# Patient Record
Sex: FEMALE | Race: WHITE | Employment: OTHER | URBAN - METROPOLITAN AREA
[De-identification: names, ages, dates, MRNs, and addresses within clinical notes are randomized per-mention and may not be internally consistent; named-entity substitution may affect disease eponyms.]

---

## 2017-05-26 PROCEDURE — 88305 TISSUE EXAM BY PATHOLOGIST: CPT | Performed by: INTERNAL MEDICINE

## 2017-09-04 ENCOUNTER — HOSPITAL ENCOUNTER (EMERGENCY)
Facility: HOSPITAL | Age: 67
Discharge: HOME | End: 2017-09-04
Attending: EMERGENCY MEDICINE
Payer: MEDICARE

## 2017-09-04 ENCOUNTER — APPOINTMENT (OUTPATIENT)
Dept: GENERAL RADIOLOGY | Facility: HOSPITAL | Age: 67
End: 2017-09-04
Attending: EMERGENCY MEDICINE
Payer: MEDICARE

## 2017-09-04 VITALS
WEIGHT: 138 LBS | TEMPERATURE: 97 F | OXYGEN SATURATION: 98 % | SYSTOLIC BLOOD PRESSURE: 141 MMHG | DIASTOLIC BLOOD PRESSURE: 58 MMHG | BODY MASS INDEX: 20.92 KG/M2 | HEART RATE: 74 BPM | HEIGHT: 68 IN | RESPIRATION RATE: 16 BRPM

## 2017-09-04 DIAGNOSIS — I48.91 ATRIAL FIBRILLATION, NEW ONSET (HCC): Primary | ICD-10-CM

## 2017-09-04 LAB
ALBUMIN SERPL-MCNC: 4 G/DL (ref 3.5–4.8)
ALP LIVER SERPL-CCNC: 53 U/L (ref 50–130)
ALT SERPL-CCNC: 21 U/L (ref 14–54)
AST SERPL-CCNC: 14 U/L (ref 15–41)
BASOPHILS # BLD AUTO: 0.02 X10(3) UL (ref 0–0.1)
BASOPHILS NFR BLD AUTO: 0.3 %
BILIRUB SERPL-MCNC: 0.4 MG/DL (ref 0.1–2)
BUN BLD-MCNC: 12 MG/DL (ref 8–20)
CALCIUM BLD-MCNC: 9.5 MG/DL (ref 8.3–10.3)
CHLORIDE: 102 MMOL/L (ref 101–111)
CO2: 25 MMOL/L (ref 22–32)
CREAT BLD-MCNC: 0.83 MG/DL (ref 0.55–1.02)
EOSINOPHIL # BLD AUTO: 0.11 X10(3) UL (ref 0–0.3)
EOSINOPHIL NFR BLD AUTO: 1.9 %
ERYTHROCYTE [DISTWIDTH] IN BLOOD BY AUTOMATED COUNT: 11.9 % (ref 11.5–16)
GLUCOSE BLD-MCNC: 120 MG/DL (ref 70–99)
HCT VFR BLD AUTO: 40.6 % (ref 34–50)
HGB BLD-MCNC: 13.8 G/DL (ref 12–16)
IMMATURE GRANULOCYTE COUNT: 0.02 X10(3) UL (ref 0–1)
IMMATURE GRANULOCYTE RATIO %: 0.3 %
LYMPHOCYTES # BLD AUTO: 1.85 X10(3) UL (ref 0.9–4)
LYMPHOCYTES NFR BLD AUTO: 32.1 %
M PROTEIN MFR SERPL ELPH: 7.4 G/DL (ref 6.1–8.3)
MCH RBC QN AUTO: 31.5 PG (ref 27–33.2)
MCHC RBC AUTO-ENTMCNC: 34 G/DL (ref 31–37)
MCV RBC AUTO: 92.7 FL (ref 81–100)
MONOCYTES # BLD AUTO: 0.63 X10(3) UL (ref 0.1–0.6)
MONOCYTES NFR BLD AUTO: 10.9 %
NEUTROPHIL ABS PRELIM: 3.13 X10 (3) UL (ref 1.3–6.7)
NEUTROPHILS # BLD AUTO: 3.13 X10(3) UL (ref 1.3–6.7)
NEUTROPHILS NFR BLD AUTO: 54.5 %
PLATELET # BLD AUTO: 238 10(3)UL (ref 150–450)
POTASSIUM SERPL-SCNC: 3.7 MMOL/L (ref 3.6–5.1)
RBC # BLD AUTO: 4.38 X10(6)UL (ref 3.8–5.1)
RED CELL DISTRIBUTION WIDTH-SD: 40.8 FL (ref 35.1–46.3)
SODIUM SERPL-SCNC: 136 MMOL/L (ref 136–144)
TROPONIN: <0.046 NG/ML (ref ?–0.05)
WBC # BLD AUTO: 5.8 X10(3) UL (ref 4–13)

## 2017-09-04 PROCEDURE — 84484 ASSAY OF TROPONIN QUANT: CPT | Performed by: EMERGENCY MEDICINE

## 2017-09-04 PROCEDURE — 99285 EMERGENCY DEPT VISIT HI MDM: CPT

## 2017-09-04 PROCEDURE — 80053 COMPREHEN METABOLIC PANEL: CPT | Performed by: EMERGENCY MEDICINE

## 2017-09-04 PROCEDURE — 71010 XR CHEST AP PORTABLE  (CPT=71010): CPT | Performed by: EMERGENCY MEDICINE

## 2017-09-04 PROCEDURE — 93005 ELECTROCARDIOGRAM TRACING: CPT

## 2017-09-04 PROCEDURE — 36415 COLL VENOUS BLD VENIPUNCTURE: CPT

## 2017-09-04 PROCEDURE — 85025 COMPLETE CBC W/AUTO DIFF WBC: CPT

## 2017-09-04 PROCEDURE — 85025 COMPLETE CBC W/AUTO DIFF WBC: CPT | Performed by: EMERGENCY MEDICINE

## 2017-09-04 PROCEDURE — 93010 ELECTROCARDIOGRAM REPORT: CPT

## 2017-09-04 PROCEDURE — 84484 ASSAY OF TROPONIN QUANT: CPT

## 2017-09-04 PROCEDURE — 80053 COMPREHEN METABOLIC PANEL: CPT

## 2017-09-04 RX ORDER — ASPIRIN 325 MG
325 TABLET ORAL DAILY
Qty: 30 TABLET | Refills: 0 | Status: SHIPPED | OUTPATIENT
Start: 2017-09-04 | End: 2017-10-04

## 2017-09-04 RX ORDER — ASPIRIN 81 MG/1
324 TABLET, CHEWABLE ORAL ONCE
Status: COMPLETED | OUTPATIENT
Start: 2017-09-04 | End: 2017-09-04

## 2017-09-05 LAB
ATRIAL RATE: 138 BPM
ATRIAL RATE: 77 BPM
P AXIS: 78 DEGREES
P-R INTERVAL: 198 MS
Q-T INTERVAL: 310 MS
Q-T INTERVAL: 378 MS
QRS DURATION: 80 MS
QRS DURATION: 82 MS
QTC CALCULATION (BEZET): 427 MS
QTC CALCULATION (BEZET): 456 MS
R AXIS: 44 DEGREES
R AXIS: 67 DEGREES
T AXIS: -78 DEGREES
T AXIS: 53 DEGREES
VENTRICULAR RATE: 130 BPM
VENTRICULAR RATE: 77 BPM

## 2017-09-05 NOTE — ED NOTES
Pt resting comfortably with family at bedside. Pt converted to NSR without intervention. Denies palpitations at present time.

## 2017-09-05 NOTE — ED INITIAL ASSESSMENT (HPI)
Pt presents to ED with with palpitations since this Am. Pt reports palpitations intermittent throughout the day with persistent palpitations since 815pm. Pt denies chest pain, dizziness. Pt reports mild SOB.

## 2017-09-05 NOTE — ED PROVIDER NOTES
Patient Seen in: [facility] Emergency Department    History   Patient presents with:  Arrythmia/Palpitations (cardiovascular)    Stated Complaint: chest pain, palpitations    HPI    62-year-old woman here with palpitations have been on and off for s Tab,  Take 0.5 tablets (12.5 mg total) by mouth 2 (two) times daily. aspirin 325 MG Oral Tab,  Take 1 tablet (325 mg total) by mouth daily. Estrogens Conjugated (PREMARIN) 0.3 MG Oral Tab,  Take 1 tablet (0.3 mg total) by mouth every other day.    [place] kg/m²         Physical Exam   Constitutional: She is oriented to person, place, and time. She appears well-developed and well-nourished. No distress. HENT:   Head: Normocephalic and atraumatic.    Right Ear: External ear normal.   Left Ear: External ear n CBC W/ DIFFERENTIAL[[id number]]          Abnormal            Final result                 Please view results for these tests on the individual orders.    RAINBOW DRAW BLUE   RAINBOW DRAW GOLD   RAINBOW DRAW LAVENDER   RAINBOW DRAW LIGHT GREEN     EKG    R outpatient follow-up. Patient's well-appearing nontoxic stable for discharge.     Disposition and Plan     Clinical Impression:  Atrial fibrillation, new onset ([address]  (primary encounter diagnosis)    Disposition:  Discharge    Follow-up:  Wilfred Odonnell MD

## 2017-09-06 PROBLEM — I48.91 ATRIAL FIBRILLATION, NEW ONSET (HCC): Status: ACTIVE | Noted: 2017-09-04

## 2017-12-06 PROBLEM — E78.2 MIXED HYPERLIPIDEMIA: Status: ACTIVE | Noted: 2017-12-06

## 2018-03-14 PROCEDURE — 87086 URINE CULTURE/COLONY COUNT: CPT | Performed by: FAMILY MEDICINE

## 2018-05-15 PROCEDURE — 87086 URINE CULTURE/COLONY COUNT: CPT | Performed by: FAMILY MEDICINE

## 2018-06-18 PROBLEM — R07.2 PRECORDIAL CHEST PAIN: Status: ACTIVE | Noted: 2018-06-18

## 2018-08-28 PROBLEM — Z86.79 HISTORY OF ATRIAL FIBRILLATION: Status: ACTIVE | Noted: 2018-08-28

## 2018-08-28 PROCEDURE — 84134 ASSAY OF PREALBUMIN: CPT | Performed by: FAMILY MEDICINE

## 2018-08-28 PROCEDURE — 81001 URINALYSIS AUTO W/SCOPE: CPT | Performed by: FAMILY MEDICINE

## 2020-01-14 PROBLEM — I48.0 PAROXYSMAL ATRIAL FIBRILLATION (HCC): Status: ACTIVE | Noted: 2020-01-14

## 2020-01-14 PROBLEM — I48.91 ATRIAL FIBRILLATION, NEW ONSET (HCC): Status: RESOLVED | Noted: 2017-09-04 | Resolved: 2020-01-14

## 2020-01-22 PROBLEM — R07.2 PRECORDIAL CHEST PAIN: Status: RESOLVED | Noted: 2018-06-18 | Resolved: 2020-01-22

## 2021-11-03 PROBLEM — D68.69 SECONDARY HYPERCOAGULABLE STATE (HCC): Status: ACTIVE | Noted: 2021-11-03

## 2022-01-20 PROBLEM — F41.1 GAD (GENERALIZED ANXIETY DISORDER): Status: ACTIVE | Noted: 2022-01-20

## 2022-01-20 PROBLEM — F41.0 PANIC DISORDER (EPISODIC PAROXYSMAL ANXIETY): Status: ACTIVE | Noted: 2022-01-20

## 2022-01-20 PROBLEM — Z86.79 H/O MITRAL VALVE PROLAPSE: Status: ACTIVE | Noted: 2022-01-20

## (undated) NOTE — ED AVS SNAPSHOT
Guru Mark   MRN: [id number]    Department:  [facility] Emergency Department   Date of Visit:  9/4/2017           Disclosure     Insurance plans vary and the physician(s) referred by the ER may not be covered by your plan.  Please contact you If you have been prescribed any medication(s), please fill your prescription right away and begin taking the medication(s) as directed    If the emergency physician has read X-rays, these will be re-interpreted by a radiologist.  If there is a significant